# Patient Record
Sex: MALE | Race: WHITE | NOT HISPANIC OR LATINO | Employment: FULL TIME | URBAN - METROPOLITAN AREA
[De-identification: names, ages, dates, MRNs, and addresses within clinical notes are randomized per-mention and may not be internally consistent; named-entity substitution may affect disease eponyms.]

---
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
## 2024-10-10 ENCOUNTER — HOSPITAL ENCOUNTER (EMERGENCY)
Facility: HOSPITAL | Age: 24
Discharge: HOME | End: 2024-10-10
Admitting: STUDENT IN AN ORGANIZED HEALTH CARE EDUCATION/TRAINING PROGRAM
Payer: COMMERCIAL

## 2024-10-10 VITALS
TEMPERATURE: 96.8 F | BODY MASS INDEX: 25.2 KG/M2 | RESPIRATION RATE: 12 BRPM | HEART RATE: 69 BPM | HEIGHT: 71 IN | DIASTOLIC BLOOD PRESSURE: 70 MMHG | OXYGEN SATURATION: 100 % | SYSTOLIC BLOOD PRESSURE: 126 MMHG | WEIGHT: 180 LBS

## 2024-10-10 DIAGNOSIS — S05.02XA ABRASION OF LEFT CORNEA, INITIAL ENCOUNTER: ICD-10-CM

## 2024-10-10 DIAGNOSIS — T15.92XA FOREIGN BODY OF LEFT EYE, INITIAL ENCOUNTER: ICD-10-CM

## 2024-10-10 PROCEDURE — 65220 REMOVE FOREIGN BODY FROM EYE: CPT

## 2024-10-10 PROCEDURE — 250N000009 HC RX 250: Performed by: STUDENT IN AN ORGANIZED HEALTH CARE EDUCATION/TRAINING PROGRAM

## 2024-10-10 PROCEDURE — 99283 EMERGENCY DEPT VISIT LOW MDM: CPT | Mod: 25

## 2024-10-10 RX ORDER — ERYTHROMYCIN 5 MG/G
0.5 OINTMENT OPHTHALMIC 4 TIMES DAILY
Qty: 3.5 G | Refills: 0 | Status: SHIPPED | OUTPATIENT
Start: 2024-10-10 | End: 2024-10-15

## 2024-10-10 RX ORDER — ERYTHROMYCIN 5 MG/G
OINTMENT OPHTHALMIC ONCE
Status: COMPLETED | OUTPATIENT
Start: 2024-10-10 | End: 2024-10-10

## 2024-10-10 RX ORDER — TETRACAINE HYDROCHLORIDE 5 MG/ML
1-2 SOLUTION OPHTHALMIC ONCE
Status: COMPLETED | OUTPATIENT
Start: 2024-10-10 | End: 2024-10-10

## 2024-10-10 RX ADMIN — FLUORESCEIN SODIUM 1 STRIP: 1 STRIP OPHTHALMIC at 15:00

## 2024-10-10 RX ADMIN — ERYTHROMYCIN 1 G: 5 OINTMENT OPHTHALMIC at 16:12

## 2024-10-10 RX ADMIN — TETRACAINE HYDROCHLORIDE 1 DROP: 5 SOLUTION OPHTHALMIC at 15:00

## 2024-10-10 ASSESSMENT — ACTIVITIES OF DAILY LIVING (ADL)
ADLS_ACUITY_SCORE: 35
ADLS_ACUITY_SCORE: 33

## 2024-10-10 ASSESSMENT — COLUMBIA-SUICIDE SEVERITY RATING SCALE - C-SSRS
6. HAVE YOU EVER DONE ANYTHING, STARTED TO DO ANYTHING, OR PREPARED TO DO ANYTHING TO END YOUR LIFE?: NO
2. HAVE YOU ACTUALLY HAD ANY THOUGHTS OF KILLING YOURSELF IN THE PAST MONTH?: NO
1. IN THE PAST MONTH, HAVE YOU WISHED YOU WERE DEAD OR WISHED YOU COULD GO TO SLEEP AND NOT WAKE UP?: NO

## 2024-10-10 NOTE — DISCHARGE INSTRUCTIONS
You were seen in the emergency department today for foreign body in the eye.  This was removed and you have an underlying corneal abrasion.  The rest your eye exam is normal.  You were given a prescription for an antibiotic ointment called erythromycin, you will place a small amount in the bottom of your left eye 3-4 times daily for the next 5 days.    If you do not have significant improvement in the next 2 to 3 days will need to follow-up with the eye clinic.  I have attached the information for [place] [facility], you can call them to schedule follow-up.  If you develop any significant swelling with drainage from the eye or fevers return to the emergency department

## 2024-10-10 NOTE — ED TRIAGE NOTES
The patient was at work yesterday felt like he had something in his left eye, today it is worse, eye is reddened and painful, pt has a small black dot on the eye. Denies any visual changes.      Triage Assessment (Adult)       Row Name 10/10/24 1407          Triage Assessment    Airway WDL WDL        Respiratory WDL    Respiratory WDL WDL        Skin Circulation/Temperature WDL    Skin Circulation/Temperature WDL WDL        Cardiac WDL    Cardiac WDL WDL        Peripheral/Neurovascular WDL    Peripheral Neurovascular WDL WDL        Cognitive/Neuro/Behavioral WDL    Cognitive/Neuro/Behavioral WDL WDL

## 2024-10-10 NOTE — ED PROVIDER NOTES
Emergency Department Encounter   NAME: Cooper Williamson ; AGE: 24 year old male ; YOB: 2000 ; MRN: [id number] ; PCP: No Ref-Primary, Physician   ED PROVIDER: Elsie Almanza PA-C    Evaluation Date & Time:   10/10/2024  2:58 PM    CHIEF COMPLAINT:  Eye Problem        Impression and Plan   FINAL IMPRESSION:    ICD-10-CM    1. Foreign body of left eye, initial encounter  T15.92XA       2. Abrasion of left cornea, initial encounter  S05.02XA           ED Course and Medical Decision Making  Kong is a 24-year-old male with no significant PMH presenting to the emergency department for evaluation of foreign body in the left eye.  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  He cannot remember what the material was, whether it was organic or metal.  He does not wear glasses or contacts and has never been to an eye doctor.  He woke up this morning and still had irritation in the eye so came to the emergency department for further evaluation.  He states feeling as though something is stuck in his eye but otherwise does not have any eye pain.  No changes in vision.  Denies photosensitivity.    Vitals reviewed and unremarkable. On exam he is resting comfortably. Differential diagnosis/emergent conditions considered and evaluated for includes but not limited to ocular foreign body, corneal abrasion, hyphema, traumatic iritis, subconjunctival hemorrhage, globe rupture. Left eye conjunctiva is mildly injected when compared to the right. There is a black pinpoint foreign body present at the 7 o'clock position of the iris.  No hyphema or signs of trauma to the eye.  The foreign body was removed with a Q-tip fairly easily here in the emergency department.  There is a very small rust ring present following removal of the foreign body. IOP 2 bilaterally and not indicative of acute angle-closure glaucoma.  Wood lamp exam finds a small pinpoint area of  increased dye uptake, otherwise no other areas of increased dye uptake or dendritic lesions.  Negative Jd sign. I ordered updated tetanus vaccine for the patient and initially he was on board with this but when nursing staff went in to update his tetanus vaccine he declined.  Patient was educated further on importance of updating tetanus vaccine but still declined and understands the risks associated with this.  Erythromycin ointment was placed in the left eye and he was discharged with a prescription for erythromycin to use 3-4 times per day for the next 5 days.  I have given him the information for [facility] patient is not currently established with an eye clinic.  I recommended he follow-up in a few days for recheck, especially given presence of rust ring he is at increased risk for infection.  He was educated on concerning symptoms that would warrant return to the emergency department is understanding and agreeable to this plan.        Supplemental history:  Obtained supplemental history:Supplemental history obtained?: No  Reviewed external records: External records reviewed?: No  Patient information was obtained from: patient  Use of Intrepreter: N/A     Complicating Factors:  Care impacted by chronic illness:Documented in Chart  Care significantly affected by social determinants of health:N/A    Work Up:  Did you consider but not order tests?: Work up considered but not performed and documented in chart, if applicable  Did you interpret images independently?: Independent interpretation of ECG and images noted in documentation, when applicable.    External Consults:  Consultation discussion with other provider:Did you involve another provider (consultant, MH, pharmacy, etc.)?: No  Discharge. I prescribed additional prescription strength medication(s) as charted. See documentation for any additional details.        ED COURSE:  3:00 PM I met and introduced myself to the patient. I gathered initial  history and performed my physical exam. We discussed plan for initial workup.   4:04 PM I rechecked the patient and discussed results, discharge, follow up, and reasons to return to the ED.     At the conclusion of the encounter I discussed the results of all the tests and the disposition. The questions were answered. The patient or family acknowledged understanding and was agreeable with the care plan.        MEDICATIONS GIVEN IN THE EMERGENCY DEPARTMENT:  Medications   erythromycin (ROMYCIN) ophthalmic ointment (has no administration in time range)   tetracaine (PONTOCAINE) 0.5 % ophthalmic solution 1-2 drop (1 drop Both Eyes $Given 10/10/24 1500)   fluorescein (FUL-WINNIE) ophthalmic strip 1 strip (1 strip Both Eyes $Given 10/10/24 1500)   Tdap (tetanus-diphtheria-acell pertussis) (ADACEL) injection 0.5 mL (0.5 mLs Intramuscular Not Given 10/10/24 1554)         NEW PRESCRIPTIONS STARTED AT TODAY'S ED VISIT:  New Prescriptions    ERYTHROMYCIN (ROMYCIN) 5 MG/GM OPHTHALMIC OINTMENT    Place 0.5 inches Into the left eye 4 times daily for 5 days.         JEAN PIERRE Triana is a 24-year-old male with no significant PMH presenting to the emergency department for evaluation of foreign body in the left eye.  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  He cannot remember what the material was, whether it was organic or metal.  He does not wear glasses or contacts and has never been to an eye doctor.  He woke up this morning and still had irritation in the eye so came to the emergency department for further evaluation.  He states feeling as though something is stuck in his eye but otherwise does not have any eye pain.  No changes in vision.  Denies photosensitivity.      Medical History     No past medical history on file.    No past surgical history on file.    Family History   Problem Relation Age of Onset    C.A.D. No family hx of        Social History  "    Tobacco Use    Smoking status: Never    Smokeless tobacco: Never    Tobacco comments:     no exposure   Substance Use Topics    Alcohol use: No    Drug use: No       erythromycin (ROMYCIN) 5 MG/GM ophthalmic ointment          Physical Exam     First Vitals:  Patient Vitals for the past 24 hrs:   BP Temp Temp src Pulse Resp SpO2 Height Weight   10/10/24 1407 126/70 96.8  F (36  C) Temporal 69 12 100 % 1.803 m (5' 11\") 81.6 kg (180 lb)         PHYSICAL EXAM    General Appearance:  Alert, cooperative, no distress, appears stated age  HENT: Normocephalic without obvious deformity, atraumatic. Mucous membranes moist   Eyes: Conjunctiva mildly injected on the left, normal on the right. Lids normal. No discharge. There is a black pinpoint foreign body present at the 7 o'clock position of the iris.  No hyphema or signs of trauma to the eye.  IOP 2 bilaterally.  Extraocular movements painless and intact.  Woods lamp exam finds a small area of increased dye uptake at the 7 o'clock position of his iris.  No dendritic lesions.  Negative Jd sign.  Respiratory: No distress.   Cardiovascular: Regular rate   Musculoskeletal: Moving all extremities. No gross deformities  Integument: Warm, dry, no rashes or lesions  Neurologic: Alert and orientated x3. No focal deficits.  Psych: Normal mood and affect        Results     LAB:  All pertinent labs reviewed and interpreted  Labs Ordered and Resulted from Time of ED Arrival to Time of ED Departure - No data to display    RADIOLOGY:  No orders to display         ECG:  N/A      PROCEDURES:    PROCEDURE: Woods lamp Exam   INDICATIONS: Left eye foreign body   PROCEDURE PROVIDER: Elsie Almanza PA-C   SITE: left eye   CONSENT: The risks, benefits and alternatives for this procedure were explained to the patient and verbally accepted.     MEDICATION: fluorescein stain and tetracaine   EXAM FINDINGS: Right Eye: No areas of increased dye uptake.  Left Eye: small area of increased dye " uptake at the 7 o'clock position of his iris.  No dendritic lesions.  Negative Jd sign.   COMPLICATIONS: Patient tolerated procedure well, without complication           Elsie Almanza PA-C   Emergency Medicine   [facility] EMERGENCY DEPARTMENT       Elsie Almanza PA-C  10/12/24 0915